# Patient Record
Sex: FEMALE | Race: WHITE | Employment: STUDENT | URBAN - METROPOLITAN AREA
[De-identification: names, ages, dates, MRNs, and addresses within clinical notes are randomized per-mention and may not be internally consistent; named-entity substitution may affect disease eponyms.]

---

## 2023-03-21 LAB
CALCIDIOL (25 OH VITAMIN D3) (NG/ML) IN SER/PLAS: 27 NG/ML
THYROTROPIN (MIU/L) IN SER/PLAS BY DETECTION LIMIT <= 0.05 MIU/L: 1.15 MIU/L (ref 0.44–3.98)

## 2024-03-04 ENCOUNTER — OFFICE VISIT (OUTPATIENT)
Dept: PRIMARY CARE | Facility: CLINIC | Age: 24
End: 2024-03-04
Payer: COMMERCIAL

## 2024-03-04 VITALS — SYSTOLIC BLOOD PRESSURE: 124 MMHG | HEART RATE: 64 BPM | TEMPERATURE: 98.2 F | DIASTOLIC BLOOD PRESSURE: 84 MMHG

## 2024-03-04 DIAGNOSIS — F41.9 ANXIETY: ICD-10-CM

## 2024-03-04 DIAGNOSIS — R05.1 ACUTE COUGH: Primary | ICD-10-CM

## 2024-03-04 DIAGNOSIS — J02.9 SORE THROAT: ICD-10-CM

## 2024-03-04 PROBLEM — E07.9 THYROID DISEASE: Status: ACTIVE | Noted: 2022-11-10

## 2024-03-04 LAB — POC RAPID STREP: NEGATIVE

## 2024-03-04 PROCEDURE — 1036F TOBACCO NON-USER: CPT | Performed by: FAMILY MEDICINE

## 2024-03-04 PROCEDURE — 87634 RSV DNA/RNA AMP PROBE: CPT

## 2024-03-04 PROCEDURE — 99214 OFFICE O/P EST MOD 30 MIN: CPT | Performed by: FAMILY MEDICINE

## 2024-03-04 PROCEDURE — 87081 CULTURE SCREEN ONLY: CPT

## 2024-03-04 PROCEDURE — 87880 STREP A ASSAY W/OPTIC: CPT | Performed by: FAMILY MEDICINE

## 2024-03-04 PROCEDURE — 87636 SARSCOV2 & INF A&B AMP PRB: CPT

## 2024-03-04 RX ORDER — SERTRALINE HYDROCHLORIDE 25 MG/1
25 TABLET, FILM COATED ORAL DAILY
COMMUNITY
Start: 2024-02-15 | End: 2024-03-04 | Stop reason: SDUPTHER

## 2024-03-04 RX ORDER — BENZONATATE 100 MG/1
100 CAPSULE ORAL 3 TIMES DAILY PRN
Qty: 42 CAPSULE | Refills: 0 | Status: SHIPPED | OUTPATIENT
Start: 2024-03-04 | End: 2024-03-22 | Stop reason: ALTCHOICE

## 2024-03-04 RX ORDER — SERTRALINE HYDROCHLORIDE 25 MG/1
25 TABLET, FILM COATED ORAL DAILY
Qty: 90 TABLET | Refills: 1 | Status: SHIPPED | OUTPATIENT
Start: 2024-03-04

## 2024-03-04 RX ORDER — FLUTICASONE PROPIONATE 50 MCG
1 SPRAY, SUSPENSION (ML) NASAL DAILY
Qty: 16 G | Refills: 0 | Status: SHIPPED | OUTPATIENT
Start: 2024-03-04 | End: 2025-03-04

## 2024-03-04 ASSESSMENT — ENCOUNTER SYMPTOMS
VOMITING: 0
SINUS PRESSURE: 1
SORE THROAT: 1
NAUSEA: 0
FATIGUE: 1
DIARRHEA: 0
COUGH: 1

## 2024-03-04 NOTE — LETTER
March 4, 2024     Patient: Vandana Jackson   YOB: 2000   Date of Visit: 3/4/2024       To Whom It May Concern:    Vandana Jackson was seen in my clinic on 3/4/2024 at 3:50 pm. Please excuse Vandana for her absence from work on 3/5/24.           Sincerely,         Tasneem Frederick, DO

## 2024-03-04 NOTE — PROGRESS NOTES
"Subjective   Patient ID: Vandana Jackson is a 24 y.o. female who presents for New Patient Visit, Sore Throat (Swelling, \"white spots on throat\"), and Cough (Congestion x 2 days/Covid/Flu, throat cx collected).    Vandana presents to [facility] care. She started getting sick yesterday. Has had cough, congestion, sore throat. No fevers.     On sertraline for anxiety. Medication working well. No adverse effects.              Review of Systems   Constitutional:  Positive for fatigue.   HENT:  Positive for congestion, sinus pressure and sore throat.    Respiratory:  Positive for cough.    Gastrointestinal:  Negative for diarrhea, nausea and vomiting.       Objective   /84   Pulse 64   Temp 36.8 °C (98.2 °F)     Physical Exam  Constitutional:       General: She is not in acute distress.     Appearance: She is not toxic-appearing.   HENT:      Right Ear: Tympanic membrane normal.      Left Ear: Tympanic membrane normal.      Nose: Congestion and rhinorrhea present.      Mouth/Throat:      Pharynx: No oropharyngeal exudate or posterior oropharyngeal erythema.   Cardiovascular:      Rate and Rhythm: Normal rate and regular rhythm.      Heart sounds: No murmur heard.  Pulmonary:      Effort: No respiratory distress.      Breath sounds: No wheezing or rales.   Neurological:      Mental Status: She is alert.         Assessment/Plan   Diagnoses and all orders for this visit:  Acute cough  Comments:  Likely viral infection. COVID/flu/RSV sent. Off work tomorrow.  Orders:  -     Sars-CoV-2 PCR; Future  -     RSV PCR; Future  -     Influenza A, and B PCR; Future  -     benzonatate (Tessalon) 100 mg capsule; Take 1 capsule (100 mg) by mouth 3 times a day as needed for cough. Do not crush or chew.  -     fluticasone (Flonase) 50 mcg/actuation nasal spray; Administer 1 spray into each nostril once daily. Shake gently. Before first use, prime pump. After use, clean tip and replace cap.  Sore throat  Comments:  Rapid strep " negative. Throat culture sent to confirm.  Orders:  -     POCT Rapid Strep A manually resulted  -     Group A Streptococcus, Culture; Future  Anxiety  Comments:  Sertraline refilled. Follow-up in 6 months or sooner as needed.  Orders:  -     sertraline (Zoloft) 25 mg tablet; Take 1 tablet (25 mg) by mouth once daily.

## 2024-03-05 LAB
FLUAV RNA RESP QL NAA+PROBE: NOT DETECTED
FLUBV RNA RESP QL NAA+PROBE: NOT DETECTED
RSV RNA RESP QL NAA+PROBE: NOT DETECTED
SARS-COV-2 RNA RESP QL NAA+PROBE: NOT DETECTED

## 2024-03-07 LAB — S PYO THROAT QL CULT: NORMAL

## 2024-03-11 ENCOUNTER — TELEPHONE (OUTPATIENT)
Dept: PRIMARY CARE | Facility: CLINIC | Age: 24
End: 2024-03-11
Payer: COMMERCIAL

## 2024-03-11 DIAGNOSIS — R05.1 ACUTE COUGH: Primary | ICD-10-CM

## 2024-03-11 RX ORDER — AZITHROMYCIN 250 MG/1
TABLET, FILM COATED ORAL
Qty: 6 TABLET | Refills: 0 | Status: SHIPPED | OUTPATIENT
Start: 2024-03-11 | End: 2024-03-22 | Stop reason: ALTCHOICE

## 2024-03-11 NOTE — TELEPHONE ENCOUNTER
Pt called, she was seen last week for sick visit. Pt still not feeling well. Loss of voice, coughing up green stuff. Pt wanting to know if ABX can be called in? This has been ongoing for over 1 week now. Nothing was sent in for pt when she was here for OV. [place]. Please advise. Thanks. VIPUL

## 2024-03-21 ENCOUNTER — TELEPHONE (OUTPATIENT)
Dept: PRIMARY CARE | Facility: CLINIC | Age: 24
End: 2024-03-21
Payer: COMMERCIAL

## 2024-03-21 DIAGNOSIS — R05.1 ACUTE COUGH: Primary | ICD-10-CM

## 2024-03-21 NOTE — TELEPHONE ENCOUNTER
Pt called in stating she was just seen on 3/4/24 for cough and pt states she is still having issues, it has not gone away. Is there anything else that can be sent in for pt or do you want to reevaluate? Please advise, AM

## 2024-03-22 ENCOUNTER — OFFICE VISIT (OUTPATIENT)
Dept: PRIMARY CARE | Facility: CLINIC | Age: 24
End: 2024-03-22
Payer: COMMERCIAL

## 2024-03-22 VITALS
OXYGEN SATURATION: 99 % | DIASTOLIC BLOOD PRESSURE: 82 MMHG | HEART RATE: 89 BPM | SYSTOLIC BLOOD PRESSURE: 124 MMHG | TEMPERATURE: 98.2 F

## 2024-03-22 DIAGNOSIS — J01.40 SUBACUTE PANSINUSITIS: ICD-10-CM

## 2024-03-22 DIAGNOSIS — R05.1 ACUTE COUGH: Primary | ICD-10-CM

## 2024-03-22 DIAGNOSIS — J02.9 SORE THROAT: ICD-10-CM

## 2024-03-22 PROBLEM — J30.2 SEASONAL ALLERGIES: Status: ACTIVE | Noted: 2024-03-22

## 2024-03-22 PROBLEM — F41.9 ANXIETY: Status: ACTIVE | Noted: 2024-03-22

## 2024-03-22 LAB — POC RAPID STREP: NEGATIVE

## 2024-03-22 PROCEDURE — 1036F TOBACCO NON-USER: CPT | Performed by: FAMILY MEDICINE

## 2024-03-22 PROCEDURE — 87081 CULTURE SCREEN ONLY: CPT

## 2024-03-22 PROCEDURE — 99214 OFFICE O/P EST MOD 30 MIN: CPT | Performed by: FAMILY MEDICINE

## 2024-03-22 PROCEDURE — 87880 STREP A ASSAY W/OPTIC: CPT | Performed by: FAMILY MEDICINE

## 2024-03-22 RX ORDER — AZITHROMYCIN 250 MG/1
TABLET, FILM COATED ORAL
Qty: 6 TABLET | Refills: 0 | Status: SHIPPED | OUTPATIENT
Start: 2024-03-22 | End: 2024-03-22 | Stop reason: ALTCHOICE

## 2024-03-22 RX ORDER — CEFDINIR 300 MG/1
300 CAPSULE ORAL 2 TIMES DAILY
Qty: 14 CAPSULE | Refills: 0 | Status: SHIPPED | OUTPATIENT
Start: 2024-03-22 | End: 2024-03-29

## 2024-03-22 ASSESSMENT — ENCOUNTER SYMPTOMS
COUGH: 1
WHEEZING: 0
DIARRHEA: 0
CHILLS: 0
FEVER: 0
PALPITATIONS: 0
SHORTNESS OF BREATH: 0
SORE THROAT: 1
VOMITING: 0

## 2024-03-22 NOTE — TELEPHONE ENCOUNTER
Canceled rx. Patient on schedule today. Already had abx called in earlier this month for same issue.

## 2024-03-22 NOTE — LETTER
March 22, 2024     Patient: Vandana Jackson   YOB: 2000   Date of Visit: 3/22/2024       To Whom It May Concern:    Vandana Jackson was seen in my clinic on 3/22/2024 at 2:10 pm. Please excuse Vandana for her absence from work on this day to make the appointment.             Sincerely,         Tasneem Frederick, DO

## 2024-03-22 NOTE — PROGRESS NOTES
Subjective   Patient ID: Vandana Jackson is a 24 y.o. female who presents for Cough (congestion), Sore Throat (Throat cx collected), and Earache (Bilat ear discomfort x 2 days).    Vandana presents with illness. Reports that she has been feeling sick since last encouter. Felt better while on azithromycin but then symptoms returned. Still having sor throat, sinus pressure and cough. No vomiting or diarrhea. Does not feel that her symptoms have acutely worsened.          Review of Systems   Constitutional:  Negative for chills and fever.   HENT:  Positive for congestion, ear pain and sore throat.    Respiratory:  Positive for cough. Negative for shortness of breath and wheezing.    Cardiovascular:  Negative for chest pain and palpitations.   Gastrointestinal:  Negative for diarrhea and vomiting.       Objective   /82   Pulse 89   Temp 36.8 °C (98.2 °F)   SpO2 99%     Physical Exam  Constitutional:       General: She is not in acute distress.     Appearance: Normal appearance.   HENT:      Head: Normocephalic.      Mouth/Throat:      Mouth: Mucous membranes are moist.   Eyes:      Extraocular Movements: Extraocular movements intact.      Conjunctiva/sclera: Conjunctivae normal.   Cardiovascular:      Rate and Rhythm: Normal rate and regular rhythm.      Heart sounds: No murmur heard.  Pulmonary:      Breath sounds: No wheezing or rhonchi.   Abdominal:      Palpations: Abdomen is soft.      Tenderness: There is no abdominal tenderness. There is no guarding.   Musculoskeletal:      Cervical back: Neck supple.   Skin:     General: Skin is warm and dry.   Neurological:      Mental Status: She is alert.   Psychiatric:         Mood and Affect: Mood normal.         Behavior: Behavior normal.         Assessment/Plan   Diagnoses and all orders for this visit:  Acute cough  -     cefdinir (Omnicef) 300 mg capsule; Take 1 capsule (300 mg) by mouth 2 times a day for 7 days.  Subacute pansinusitis  -     cefdinir  (Omnicef) 300 mg capsule; Take 1 capsule (300 mg) by mouth 2 times a day for 7 days.  Sore throat  -     POCT Rapid Strep A manually resulted  -     Group A Streptococcus, Culture; Future    Strep test negative. Start cefdinir. Throat culture sent. If not improving after antibiotics, recommend ENT referral.

## 2024-03-22 NOTE — TELEPHONE ENCOUNTER
Sorry to hear that she is not feeling better. Please let her know that I sent in a prescription for an antibiotic. If symptoms do not improve after completing antibiotic, then should be re-evaluated in office.

## 2024-03-25 LAB — S PYO THROAT QL CULT: NORMAL

## 2024-04-26 ENCOUNTER — TELEPHONE (OUTPATIENT)
Dept: PRIMARY CARE | Facility: CLINIC | Age: 24
End: 2024-04-26
Payer: COMMERCIAL

## 2024-04-26 DIAGNOSIS — J01.41 ACUTE RECURRENT PANSINUSITIS: Primary | ICD-10-CM

## 2024-05-01 ENCOUNTER — OFFICE VISIT (OUTPATIENT)
Dept: PRIMARY CARE | Facility: CLINIC | Age: 24
End: 2024-05-01
Payer: COMMERCIAL

## 2024-05-01 VITALS
OXYGEN SATURATION: 99 % | HEART RATE: 82 BPM | SYSTOLIC BLOOD PRESSURE: 118 MMHG | TEMPERATURE: 97 F | DIASTOLIC BLOOD PRESSURE: 70 MMHG

## 2024-05-01 DIAGNOSIS — H66.92 LEFT OTITIS MEDIA, UNSPECIFIED OTITIS MEDIA TYPE: ICD-10-CM

## 2024-05-01 DIAGNOSIS — J01.41 ACUTE RECURRENT PANSINUSITIS: Primary | ICD-10-CM

## 2024-05-01 PROCEDURE — 1036F TOBACCO NON-USER: CPT | Performed by: FAMILY MEDICINE

## 2024-05-01 PROCEDURE — 99214 OFFICE O/P EST MOD 30 MIN: CPT | Performed by: FAMILY MEDICINE

## 2024-05-01 RX ORDER — AMOXICILLIN AND CLAVULANATE POTASSIUM 875; 125 MG/1; MG/1
875 TABLET, FILM COATED ORAL 2 TIMES DAILY
Qty: 14 TABLET | Refills: 0 | Status: SHIPPED | OUTPATIENT
Start: 2024-05-01 | End: 2024-05-06 | Stop reason: WASHOUT

## 2024-05-01 ASSESSMENT — ENCOUNTER SYMPTOMS
SINUS PRESSURE: 1
SORE THROAT: 1
FATIGUE: 1
VOMITING: 0
FEVER: 0
DIARRHEA: 0
NAUSEA: 0
COUGH: 1
CHILLS: 1

## 2024-05-01 NOTE — PROGRESS NOTES
"Subjective   Patient ID: Vandana Jackson is a 24 y.o. female who presents for Earache (Throbbing pain when bending over, \"feels like underwater\"), Nasal Congestion, Sore Throat, and Cough (Green mucus x 1 week).    Vandana has been sick for the last week. Has been congested. Throat sore. Having sinus pressure in maxillary and frontal sinuses. Left ear painful. No fevers. Feels tired. Did got to [facility] on Saturday. COVID, flu and strep testing done and all negative.            Review of Systems   Constitutional:  Positive for chills and fatigue. Negative for fever.   HENT:  Positive for congestion, ear pain, sinus pressure and sore throat.    Respiratory:  Positive for cough.    Gastrointestinal:  Negative for diarrhea, nausea and vomiting.       Objective   /70   Pulse 82   Temp 36.1 °C (97 °F)   SpO2 99%     Physical Exam  Constitutional:       General: She is not in acute distress.     Appearance: She is not toxic-appearing.   HENT:      Right Ear: Tympanic membrane normal.      Left Ear: Tympanic membrane is erythematous.      Nose: Congestion and rhinorrhea present.   Cardiovascular:      Rate and Rhythm: Normal rate and regular rhythm.      Heart sounds: No murmur heard.  Pulmonary:      Effort: No respiratory distress.      Breath sounds: No wheezing or rales.   Neurological:      Mental Status: She is alert.         Assessment/Plan   Diagnoses and all orders for this visit:  Acute recurrent pansinusitis  Comments:  Start Augmentin. Follow-up with ENT regarding recurrent sinus infecitons.  Orders:  -     amoxicillin-pot clavulanate (Augmentin) 875-125 mg tablet; Take 1 tablet (875 mg) by mouth 2 times a day for 7 days.  Left otitis media, unspecified otitis media type  -     amoxicillin-pot clavulanate (Augmentin) 875-125 mg tablet; Take 1 tablet (875 mg) by mouth 2 times a day for 7 days.         "

## 2024-05-01 NOTE — LETTER
May 1, 2024     Patient: Vandana Jackson   YOB: 2000   Date of Visit: 5/1/2024       To Whom It May Concern:    Vandana Jackson was seen in my clinic on 5/1/2024 at 10:10 am. Please excuse Vandana for her absence from work on 5/1/24-5/2/24.           Sincerely,         Tasneem Frederick, DO

## 2024-05-06 ENCOUNTER — TELEPHONE (OUTPATIENT)
Dept: PRIMARY CARE | Facility: CLINIC | Age: 24
End: 2024-05-06
Payer: COMMERCIAL

## 2024-05-06 DIAGNOSIS — J01.41 ACUTE RECURRENT PANSINUSITIS: Primary | ICD-10-CM

## 2024-05-06 RX ORDER — CEFDINIR 300 MG/1
300 CAPSULE ORAL 2 TIMES DAILY
Qty: 14 CAPSULE | Refills: 0 | Status: SHIPPED | OUTPATIENT
Start: 2024-05-06 | End: 2024-05-13

## 2024-05-06 NOTE — TELEPHONE ENCOUNTER
Pt was seen 5/1, she states she leaves country tomorrow and is still sick. She states she improved very little, but ear pain seems worse.  Pt would like a different antibitoic.    Please advise x

## 2024-05-22 ENCOUNTER — APPOINTMENT (OUTPATIENT)
Dept: OTOLARYNGOLOGY | Facility: CLINIC | Age: 24
End: 2024-05-22
Payer: COMMERCIAL

## 2024-06-03 ENCOUNTER — TELEPHONE (OUTPATIENT)
Dept: ENDOCRINOLOGY | Facility: CLINIC | Age: 24
End: 2024-06-03

## 2024-06-03 DIAGNOSIS — E06.3 AUTOIMMUNE THYROIDITIS: Primary | Chronic | ICD-10-CM

## 2024-06-03 NOTE — TELEPHONE ENCOUNTER
Patient called to schedule follow up visit. Patient was to be seen 9/6/23 but no showed. She wants to know are labs needed at this time or should she wait until visit in October. Please advise.

## 2024-06-14 ENCOUNTER — APPOINTMENT (OUTPATIENT)
Dept: PRIMARY CARE | Facility: CLINIC | Age: 24
End: 2024-06-14
Payer: COMMERCIAL

## 2024-06-17 ENCOUNTER — APPOINTMENT (OUTPATIENT)
Dept: PRIMARY CARE | Facility: CLINIC | Age: 24
End: 2024-06-17
Payer: COMMERCIAL

## 2024-06-17 VITALS
DIASTOLIC BLOOD PRESSURE: 84 MMHG | SYSTOLIC BLOOD PRESSURE: 144 MMHG | OXYGEN SATURATION: 99 % | HEART RATE: 60 BPM | TEMPERATURE: 97.8 F

## 2024-06-17 DIAGNOSIS — J02.9 SORE THROAT: Primary | ICD-10-CM

## 2024-06-17 DIAGNOSIS — J06.9 UPPER RESPIRATORY TRACT INFECTION, UNSPECIFIED TYPE: ICD-10-CM

## 2024-06-17 LAB — POC RAPID STREP: NEGATIVE

## 2024-06-17 PROCEDURE — 87081 CULTURE SCREEN ONLY: CPT

## 2024-06-17 PROCEDURE — 1036F TOBACCO NON-USER: CPT | Performed by: PHYSICIAN ASSISTANT

## 2024-06-17 PROCEDURE — 87880 STREP A ASSAY W/OPTIC: CPT | Performed by: PHYSICIAN ASSISTANT

## 2024-06-17 PROCEDURE — 99213 OFFICE O/P EST LOW 20 MIN: CPT | Performed by: PHYSICIAN ASSISTANT

## 2024-06-17 RX ORDER — FLUTICASONE PROPIONATE 50 MCG
2 SPRAY, SUSPENSION (ML) NASAL DAILY
Qty: 16 G | Refills: 1 | Status: SHIPPED | OUTPATIENT
Start: 2024-06-17 | End: 2025-06-17

## 2024-06-17 NOTE — PROGRESS NOTES
"Subjective   Patient ID: Vandana Jackson is a 24 y.o. female who presents for Sore Throat (Swelling, \"spots on throat\" x 5 days/Throat cx collected).    HPI     Patient c/o sore throat. Also has some mild nasal drainage and PND. Not much cough. No sinus pain. Denies fever, chills, aches, shortness of breath.  Present for 5 days.  Sore throat worse in the morning and late at night.  Cough:   Nasal discharge: Described as clear.    Denies associated diarrhea, earache and vomiting.     Using Flonase periodically.   Patient denies recent known COVID exposure or international travel.     Keeps getting recurrent sore throats and URI's. Has appt with ENT to evaluate further.      reports that she has never smoked. She has never used smokeless tobacco.      Review of Systems    Objective   /84   Pulse 60   Temp 36.6 °C (97.8 °F)   SpO2 99%     Physical Exam  Vitals and nursing note reviewed.   Constitutional:       General: She is not in acute distress.     Appearance: Normal appearance.   HENT:      Head: Normocephalic.      Right Ear: Tympanic membrane, ear canal and external ear normal.      Left Ear: Tympanic membrane, ear canal and external ear normal.      Nose: Rhinorrhea present.      Right Sinus: No maxillary sinus tenderness or frontal sinus tenderness.      Left Sinus: No maxillary sinus tenderness or frontal sinus tenderness.      Mouth/Throat:      Mouth: Mucous membranes are moist.      Pharynx: Posterior oropharyngeal erythema (mild) present. No oropharyngeal exudate.      Comments: Has PND  Eyes:      General: No scleral icterus.  Cardiovascular:      Rate and Rhythm: Normal rate and regular rhythm.   Pulmonary:      Effort: Pulmonary effort is normal.      Breath sounds: Normal breath sounds. No wheezing, rhonchi or rales.   Lymphadenopathy:      Cervical: No cervical adenopathy.   Neurological:      Mental Status: She is alert.     POC Rapid Strep (no units)   Date Value   06/17/2024 Negative    "     Assessment/Plan   Diagnoses and all orders for this visit:  Sore throat  -     POCT Rapid Strep A manually resulted  -     Group A Streptococcus, Culture  -     fluticasone (Flonase) 50 mcg/actuation nasal spray; Administer 2 sprays into each nostril once daily.  Upper respiratory tract infection, unspecified type  -     fluticasone (Flonase) 50 mcg/actuation nasal spray; Administer 2 sprays into each nostril once daily.       Discussed probable viral nature of illness. Send throat cx.   Can use Mucinex DM.   Encourage fluids and rest. Do warm saltwater gargles.   Use flonase daily. Rx sent.   See ENT next month  Follow up if symptoms increase or persist.

## 2024-06-19 LAB — S PYO THROAT QL CULT: NORMAL

## 2024-07-22 ENCOUNTER — APPOINTMENT (OUTPATIENT)
Dept: OTOLARYNGOLOGY | Facility: CLINIC | Age: 24
End: 2024-07-22
Payer: COMMERCIAL

## 2024-07-22 VITALS
DIASTOLIC BLOOD PRESSURE: 69 MMHG | TEMPERATURE: 98 F | HEIGHT: 69 IN | WEIGHT: 160.2 LBS | BODY MASS INDEX: 23.73 KG/M2 | SYSTOLIC BLOOD PRESSURE: 107 MMHG

## 2024-07-22 DIAGNOSIS — H93.13 TINNITUS OF BOTH EARS: ICD-10-CM

## 2024-07-22 DIAGNOSIS — H91.93 HEARING DIFFICULTY OF BOTH EARS: Primary | ICD-10-CM

## 2024-07-22 PROCEDURE — 99203 OFFICE O/P NEW LOW 30 MIN: CPT

## 2024-07-22 PROCEDURE — 1036F TOBACCO NON-USER: CPT

## 2024-07-22 PROCEDURE — 3008F BODY MASS INDEX DOCD: CPT

## 2024-07-22 ASSESSMENT — ENCOUNTER SYMPTOMS: DEPRESSION: 0

## 2024-07-22 NOTE — PROGRESS NOTES
Patient ID: Vandana Jackson is a 24 y.o. female who presents for the evaluation of hearing difficulty in both ears. They present as a referral from Dr. Milla Frederick (PCP).    PROVIDER IMPRESSIONS:  DIAGNOSES/PROBLEMS:  -Hearing difficulty in both ears  -Tinnitus of both ears    ASSESSMENT:   Vandana Jackson is a pleasant 24 y.o. female who presents with symptoms of bilateral intermittent non-pulsatile tinnitus and bilateral hearing difficulty, left greater than right. Based on the clinical information provided, symptoms and clinical exam findings are unable to be determined without an audiogram. Reassurance provided to patient that exam today showed no evidence of acute infection or inflammation in the EAC bilaterally and that TM appears with no evidence of infection, effusion, retraction or perforation bilaterally. I explained to patient that audiogram is necessary in order to further determine recommendations for symptom management.    PLAN:  I recommended audiogram in the next 1-4 weeks to further evaluate hearing and middle ear function. Referral e-submitted.   Follow-up: Patient may schedule for follow-up virtually after obtaining audiogram to discuss the results. They may follow-up sooner, if needed. Patient is agreeable to this plan, all questions were answered to patient's satisfaction.     Subjective   HPI: Vandana Jackson is a 24 y.o. female who presents for the evaluation of hearing difficulty in both ears. The patient states that symptom onset began approximately 1 month ago following a URI and treatment for bilateral ear infection. She reports that her sister told her that when patient is talking now she sounds like she is screaming/speaking loudly. Mentions that she has experienced recurrent URI symptoms for approximately 7 months. When asked about the presence of hearing loss, ear pain, ear fullness/pressure, tinnitus, ear itching, ear drainage, autophony, dizziness or vertigo, she admits to  "intermittent tinnitus in both ears. Describes tinnitus as a non-pulsatile ringing sound that is non-bothersome. When asked about pertinent otologic history, the patient reports a history of recurrent ear infections, denies history of ear surgery, reports history of multiple PE tube insertions in childhood, and denies history of prolonged/traumatic loud noise exposure. The patient does not endorse a family history of hearing loss.   Patient previously seen by PCP on 3/22/24 for symptoms of nasal congestion, cough, ear pain, and sore throat and treated for sinus/ear infection with a z-pratima and p.o. cefdinir BID x 7 days. She was previously treated with azithromycin on 3/11/24. She was advised by PCP to start flonase on 3/4/24 but did not use because she did not want to pay for nasal spray.       PATIENT HISTORY:  Past Medical History:   Diagnosis Date    Other seasonal allergic rhinitis     Seasonal allergies      Past Surgical History:   Procedure Laterality Date    LYMPH NODE BIOPSY      age 11      Allergies   Allergen Reactions    Dog Dander Other     Congestion, watery eyes    Pollen Extracts Itching        Current Outpatient Medications:     fluticasone (Flonase) 50 mcg/actuation nasal spray, Administer 2 sprays into each nostril once daily., Disp: 16 g, Rfl: 1    sertraline (Zoloft) 25 mg tablet, Take 1 tablet (25 mg) by mouth once daily., Disp: 90 tablet, Rfl: 1   Tobacco Use: Low Risk  (7/22/2024)    Patient History     Smoking Tobacco Use: Never     Smokeless Tobacco Use: Never     Passive Exposure: Not on file      Alcohol Use: Not on file      Social History     Substance and Sexual Activity   Drug Use Never        Review of Systems   All other systems negative.     Objective   Visit Vitals  /69 (BP Location: Right arm, Patient Position: Sitting, BP Cuff Size: Adult)   Temp 36.7 °C (98 °F)   Ht 1.753 m (5' 9\")   Wt 72.7 kg (160 lb 3.2 oz)   BMI 23.66 kg/m²   Smoking Status Never   BSA 1.88 m²    "     PHYSICAL EXAM:  General appearance: Appears well, well-nourished, well groomed. No acute distress.   Constitutional: No fever, chills, weight loss or weight gain.  Communication: Normal communication  Psychiatric: Oriented to person, place and time. Normal mood and affect.  Neurologic: Cranial nerves II-XII grossly intact and symmetric bilaterally.  Cardiovascular: Examination of peripheral vascular system shows no clubbing or cyanosis.  Respiratory: No respiratory distress increased work of breathing. Inspection of the chest with symmetric chest expansion and normal respiratory effort.  Skin: No head and neck rashes.  Head: Normocephalic. Atraumatic with no masses, lesions or scarring.  Face: Normal symmetry. No scars or deformities.  Eyes: Conjunctiva not edematous or erythematous. PERRLA  Neck: Supple and symmetric, trachea midline. Lymph nodes with no adenopathy.  Head: Normocephalic. Atraumatic with no masses, lesions or scarring.  Eyes: PERRL, EOMI, Conjunctiva is clear. No nystagmus.  Nose: External inspection of nose: No nasal lesions, lacerations or scars. No tenderness on frontal or maxillary sinus palpation. Anterior rhinoscopy with limited visualization past the inferior turbinates: Septum is midline.  No septal perforation or lesions. No septal hematoma/ seroma.  No signs of bleeding.  No evidence of intranasal polyps.  Inferior turbinates are normal.    Throat:  Floor of mouth is clear, no masses.  Tongue appears normal, no lesions or masses. Gums, gingiva, buccal mucosa appear pink and moist, no lesions. Teeth are in intact.  No obvious dental infections.  Peritonsillar regions appear symmetric without swelling.  Hard and soft palate appear normal, no obvious cleft. Uvula is midline.  Left Tonsil -- 2+ and cryptic, no exudates.  Right Tonsil -- 2+ and cryptic, no exudates.  Oropharynx: No lesions. Retropharyngeal wall is flat.  No postnasal drip.  Salivary Glands: Symmetric bilaterally.  No  palpable masses.  No evidence of acute infection or salivary stones.  TMJ: Normal, no trismus.  Right Ear: External inspection of ear with no deformity, scars, or masses. Mastoid is nontender. External auditory canal is clear.  TM is intact with no sign of infection, effusion, or retraction.  No perforation seen. Auto insufflation visible under microscopy.  Left Ear: External inspection of ear with no deformity, scars, or masses. Mastoid is nontender. External auditory canal is clear.  TM is intact with no sign of infection, effusion, or retraction.  No perforation seen. Auto insufflation visible under microscopy.    RESULTS: No audiogram available for review.    Susu Barrientos, APRN-CNP

## 2024-08-22 ENCOUNTER — APPOINTMENT (OUTPATIENT)
Dept: AUDIOLOGY | Facility: CLINIC | Age: 24
End: 2024-08-22
Payer: COMMERCIAL

## 2024-08-23 ENCOUNTER — APPOINTMENT (OUTPATIENT)
Dept: OTOLARYNGOLOGY | Facility: CLINIC | Age: 24
End: 2024-08-23
Payer: COMMERCIAL

## 2024-09-04 ENCOUNTER — APPOINTMENT (OUTPATIENT)
Dept: PRIMARY CARE | Facility: CLINIC | Age: 24
End: 2024-09-04
Payer: COMMERCIAL

## 2024-10-14 NOTE — PATIENT INSTRUCTIONS
Thank you for choosing [facility]  for your health care needs.  If you have any questions, concerns or medical needs, please feel free to contact our office at [phone].    Please ensure you complete your blood work one week before the next scheduled appointment.    Will await blood tests from today  To obtain a thyroid ultrasound   Will call with result   To follow up in 1 year if the above are not concerning

## 2024-10-14 NOTE — PROGRESS NOTES
Subjective   Vandana Jackson is a 24 y.o. female who presents  for follow up for autoimmune thyroiditis.     She has had no major changes to her health since her last appointment.  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      Symptoms are as listed below:  Energy levels - good  Sleep - occasional disruptions due to stress   Temperature intolerances -denies   Bowel movements -regular; once or twice daily   Hair changes -denies   Skin changes - acne is worse    Palpitations denies   Diaphoresis - denies   Tremors -denies  Mood - on zoloft   Increasing size - denies   Dysphagia -denies   Dyspnea upon lying supine - denies   Dysphonia -denies   Weight changes - gained   Menstrual history - LMP September 26, 2024     Tobacco use - vapes   Started when she was 18; none for two years      Family history:  2 aunts - hyperthyroid   Grandmother - hyperthyroid   Mother - hypothyroid     Review of Systems   Respiratory:  Positive for cough.    Gastrointestinal:  Positive for nausea.   All other systems reviewed and are negative.      Objective   /70 (BP Location: Left arm, Patient Position: Sitting, BP Cuff Size: Adult)   Pulse 92   Wt 73.6 kg (162 lb 3.2 oz)   BMI 23.95 kg/m²    Physical Exam  Vitals and nursing note reviewed.   Constitutional:       General: She is not in acute distress.     Appearance: Normal appearance. She is normal weight.   HENT:      Head: Normocephalic and atraumatic.      Nose: Nose normal.      Mouth/Throat:      Mouth: Mucous membranes are moist.   Eyes:      Extraocular Movements: Extraocular movements intact.   Cardiovascular:      Rate and Rhythm: Normal rate and regular rhythm.   Pulmonary:      Effort: Pulmonary effort is normal.      Breath sounds: Normal breath sounds.   Musculoskeletal:         General: Normal range of motion.   Skin:     General: Skin is warm.    Neurological:      Mental Status: She is alert and oriented to person, place, and time.   Psychiatric:         Mood and Affect: Mood normal.       Lab Results   Component Value Date    TSH 1.15 03/20/2023    FREET4 0.76 02/28/2022     Imaging  Thyroid Ultrasound 3/4/2022  FINDINGS:  PARENCHYMA:  Diffusely heterogenous parenchmya with decreased  echogenicity. Mildly increased vascularity on color Doppler.     SIZE:  RIGHT LOBE:  5.3 x 1.8 x 1.9 cm.  LEFT LOBE:  5.1 x 1.7 x 1.9 cm.  ISTHMUS:  0.5 cm.     0.8 cm right thyroid midpole nodule which is hypoechoic and solid in appearance compatible a TIRADS 3 classification nodule.     CERVICAL LYMPH NODES:  No enlarged or morphologically abnormal cervical nodes are seen.     IMPRESSION:  1. Heterogeneous, enlarged thyroid with mildly increased vascularity suggestive of thyroiditis.  2. 0.8 cm right thyroid midpole TIRADS 3 classification nodule.    Assessment/Plan   24 year old female presents for follow up for Hashimoto's thyroiditis. She has not yet needed thyroid replacement therapy.    Autoimmune thyroiditis  Will await blood tests from today      Right thyroid nodule  To obtain a thyroid ultrasound   Will call with result     To follow up in 1 year if the above are not concerning

## 2024-10-15 ENCOUNTER — LAB (OUTPATIENT)
Dept: LAB | Facility: LAB | Age: 24
End: 2024-10-15

## 2024-10-15 ENCOUNTER — APPOINTMENT (OUTPATIENT)
Dept: ENDOCRINOLOGY | Facility: CLINIC | Age: 24
End: 2024-10-15
Payer: COMMERCIAL

## 2024-10-15 VITALS
DIASTOLIC BLOOD PRESSURE: 70 MMHG | BODY MASS INDEX: 23.95 KG/M2 | HEART RATE: 92 BPM | WEIGHT: 162.2 LBS | SYSTOLIC BLOOD PRESSURE: 112 MMHG

## 2024-10-15 DIAGNOSIS — E06.3 AUTOIMMUNE THYROIDITIS: Chronic | ICD-10-CM

## 2024-10-15 DIAGNOSIS — F41.9 ANXIETY: ICD-10-CM

## 2024-10-15 DIAGNOSIS — E04.1 RIGHT THYROID NODULE: ICD-10-CM

## 2024-10-15 DIAGNOSIS — E06.3 AUTOIMMUNE THYROIDITIS: Primary | ICD-10-CM

## 2024-10-15 LAB
T4 FREE SERPL-MCNC: 0.87 NG/DL (ref 0.61–1.12)
TSH SERPL-ACNC: 1.88 MIU/L (ref 0.44–3.98)

## 2024-10-15 PROCEDURE — 84443 ASSAY THYROID STIM HORMONE: CPT

## 2024-10-15 PROCEDURE — 36415 COLL VENOUS BLD VENIPUNCTURE: CPT

## 2024-10-15 PROCEDURE — 99214 OFFICE O/P EST MOD 30 MIN: CPT | Performed by: INTERNAL MEDICINE

## 2024-10-15 PROCEDURE — 84439 ASSAY OF FREE THYROXINE: CPT

## 2024-10-15 RX ORDER — CETIRIZINE HYDROCHLORIDE 10 MG/1
TABLET, CHEWABLE ORAL DAILY
COMMUNITY

## 2024-10-15 ASSESSMENT — ENCOUNTER SYMPTOMS
COUGH: 1
NAUSEA: 1

## 2024-10-17 RX ORDER — SERTRALINE HYDROCHLORIDE 25 MG/1
25 TABLET, FILM COATED ORAL DAILY
Qty: 90 TABLET | Refills: 1 | OUTPATIENT
Start: 2024-10-17

## 2024-10-17 NOTE — ASSESSMENT & PLAN NOTE
To obtain a thyroid ultrasound   Will call with result     To follow up in 1 year if the above are not concerning

## 2024-10-18 ENCOUNTER — TELEPHONE (OUTPATIENT)
Dept: ENDOCRINOLOGY | Facility: CLINIC | Age: 24
End: 2024-10-18
Payer: COMMERCIAL

## 2024-10-18 NOTE — TELEPHONE ENCOUNTER
----- Message from Christen Santos sent at 10/17/2024  1:36 PM EDT -----  Labs have been reviewed.  The thyroid level is normal.

## 2024-11-07 DIAGNOSIS — F41.9 ANXIETY: ICD-10-CM

## 2024-11-07 RX ORDER — SERTRALINE HYDROCHLORIDE 25 MG/1
25 TABLET, FILM COATED ORAL DAILY
Qty: 30 TABLET | Refills: 0 | Status: SHIPPED | OUTPATIENT
Start: 2024-11-07

## 2024-11-07 NOTE — TELEPHONE ENCOUNTER
Pt called and is scheduled for 11/27 with [facility].  Pt needs short supply until OV

## 2024-11-27 ENCOUNTER — APPOINTMENT (OUTPATIENT)
Dept: PRIMARY CARE | Facility: CLINIC | Age: 24
End: 2024-11-27
Payer: COMMERCIAL

## 2024-11-27 VITALS
DIASTOLIC BLOOD PRESSURE: 82 MMHG | OXYGEN SATURATION: 98 % | WEIGHT: 161 LBS | HEART RATE: 84 BPM | BODY MASS INDEX: 23.78 KG/M2 | SYSTOLIC BLOOD PRESSURE: 124 MMHG | TEMPERATURE: 97.6 F

## 2024-11-27 DIAGNOSIS — Z13.0 SCREENING FOR DEFICIENCY ANEMIA: ICD-10-CM

## 2024-11-27 DIAGNOSIS — Z13.1 SCREENING FOR DIABETES MELLITUS: ICD-10-CM

## 2024-11-27 DIAGNOSIS — F41.9 ANXIETY: Primary | ICD-10-CM

## 2024-11-27 DIAGNOSIS — Z13.220 SCREENING FOR HYPERLIPIDEMIA: ICD-10-CM

## 2024-11-27 PROCEDURE — 99214 OFFICE O/P EST MOD 30 MIN: CPT | Performed by: FAMILY MEDICINE

## 2024-11-27 RX ORDER — SERTRALINE HYDROCHLORIDE 50 MG/1
50 TABLET, FILM COATED ORAL DAILY
Qty: 90 TABLET | Refills: 1 | Status: SHIPPED | OUTPATIENT
Start: 2024-11-27

## 2024-11-27 RX ORDER — SERTRALINE HYDROCHLORIDE 25 MG/1
25 TABLET, FILM COATED ORAL DAILY
Qty: 90 TABLET | Refills: 1 | Status: CANCELLED | OUTPATIENT
Start: 2024-11-27

## 2024-11-27 ASSESSMENT — ENCOUNTER SYMPTOMS
CHILLS: 0
FEVER: 0
DYSPHORIC MOOD: 1
NERVOUS/ANXIOUS: 1
SLEEP DISTURBANCE: 0
COUGH: 0

## 2024-11-27 NOTE — PROGRESS NOTES
Subjective   Patient ID: Vandana Jackson is a 24 y.o. female who presents for Anxiety.    Vandana reports that her anxiety and depression have been a little bit worse since the winter months have set in.  She does feel that the sertraline is helping, but she is having more bad days than good days.  She is not experiencing any side effects from the medication.         Review of Systems   Constitutional:  Negative for chills and fever.   HENT:  Negative for congestion.    Respiratory:  Negative for cough.    Psychiatric/Behavioral:  Positive for dysphoric mood. Negative for sleep disturbance. The patient is nervous/anxious.        Objective   /82   Pulse 84   Temp 36.4 °C (97.6 °F)   Wt 73 kg (161 lb)   SpO2 98%   BMI 23.78 kg/m²     Physical Exam  Constitutional:       General: She is not in acute distress.     Appearance: Normal appearance.   HENT:      Head: Normocephalic.   Pulmonary:      Effort: Pulmonary effort is normal.   Neurological:      General: No focal deficit present.      Mental Status: She is alert.   Psychiatric:         Mood and Affect: Mood normal.         Behavior: Behavior normal.         Thought Content: Thought content normal.         Judgment: Judgment normal.         Assessment/Plan   Diagnoses and all orders for this visit:  Anxiety  Comments:  Increase sertraline to 50 mg.  Notify office of any side effects from dose increase.  Follow-up in 6 months or sooner as needed.  Orders:  -     sertraline (Zoloft) 50 mg tablet; Take 1 tablet (50 mg) by mouth once daily.  Screening for hyperlipidemia  -     Lipid Panel; Future  Screening for diabetes mellitus  -     Comprehensive Metabolic Panel; Future  -     Hemoglobin A1C; Future  Screening for deficiency anemia  -     CBC and Auto Differential; Future

## 2024-12-02 ENCOUNTER — APPOINTMENT (OUTPATIENT)
Dept: RADIOLOGY | Facility: HOSPITAL | Age: 24
End: 2024-12-02
Payer: COMMERCIAL

## 2024-12-06 ENCOUNTER — HOSPITAL ENCOUNTER (OUTPATIENT)
Dept: RADIOLOGY | Facility: HOSPITAL | Age: 24
Discharge: HOME | End: 2024-12-06
Payer: COMMERCIAL

## 2024-12-06 DIAGNOSIS — E04.1 RIGHT THYROID NODULE: ICD-10-CM

## 2024-12-06 DIAGNOSIS — E06.3 AUTOIMMUNE THYROIDITIS: ICD-10-CM

## 2024-12-06 PROCEDURE — 76536 US EXAM OF HEAD AND NECK: CPT

## 2024-12-23 ENCOUNTER — TELEPHONE (OUTPATIENT)
Dept: ENDOCRINOLOGY | Facility: CLINIC | Age: 24
End: 2024-12-23
Payer: COMMERCIAL

## 2024-12-23 NOTE — TELEPHONE ENCOUNTER
----- Message from Christen Santos sent at 12/9/2024  3:38 PM EST -----  Thyroid ultrasound has been reviewed.  Thyroid nodules are less than 1cm in size and thus are not concerning.  For follow up as scheduled.

## 2025-01-06 ENCOUNTER — APPOINTMENT (OUTPATIENT)
Dept: PRIMARY CARE | Facility: CLINIC | Age: 25
End: 2025-01-06
Payer: COMMERCIAL

## 2025-01-27 ENCOUNTER — APPOINTMENT (OUTPATIENT)
Dept: PRIMARY CARE | Facility: CLINIC | Age: 25
End: 2025-01-27
Payer: COMMERCIAL

## 2025-01-27 VITALS
TEMPERATURE: 97.2 F | OXYGEN SATURATION: 99 % | DIASTOLIC BLOOD PRESSURE: 68 MMHG | HEART RATE: 61 BPM | SYSTOLIC BLOOD PRESSURE: 104 MMHG

## 2025-01-27 DIAGNOSIS — J01.40 ACUTE NON-RECURRENT PANSINUSITIS: Primary | ICD-10-CM

## 2025-01-27 DIAGNOSIS — R05.1 ACUTE COUGH: ICD-10-CM

## 2025-01-27 PROCEDURE — 99214 OFFICE O/P EST MOD 30 MIN: CPT | Performed by: FAMILY MEDICINE

## 2025-01-27 RX ORDER — DOXYCYCLINE 100 MG/1
100 CAPSULE ORAL 2 TIMES DAILY
Qty: 14 CAPSULE | Refills: 0 | Status: SHIPPED | OUTPATIENT
Start: 2025-01-27 | End: 2025-02-03

## 2025-01-27 RX ORDER — BENZONATATE 100 MG/1
100 CAPSULE ORAL 3 TIMES DAILY PRN
Qty: 30 CAPSULE | Refills: 0 | Status: SHIPPED | OUTPATIENT
Start: 2025-01-27

## 2025-01-27 ASSESSMENT — ENCOUNTER SYMPTOMS
SORE THROAT: 0
NAUSEA: 0
DIARRHEA: 0
ABDOMINAL PAIN: 0
DYSURIA: 0
FEVER: 0
WHEEZING: 0
COUGH: 1
CHILLS: 0
VOMITING: 0
SHORTNESS OF BREATH: 0

## 2025-01-27 NOTE — PROGRESS NOTES
Subjective   Patient ID: Vandana Jackson is a 24 y.o. female who presents for Follow-up ([facility] in [place] on 1/22 Re: fever, cough, congestion, headache).    Vandana has been sick for one week. Started with congestion, cough, sore throat and ear pain. Now just persistent congestion and cough. Bringing up yellow sputum with cough. No shortness of breath or wheezing.  Some sinus pressure. No sore throat or ear pain today. No fevers. Went to [facility] but was not given any prescriptions.          Review of Systems   Constitutional:  Negative for chills and fever.   HENT:  Positive for congestion. Negative for ear pain and sore throat.    Respiratory:  Positive for cough. Negative for shortness of breath and wheezing.    Cardiovascular:  Negative for chest pain.   Gastrointestinal:  Negative for abdominal pain, diarrhea, nausea and vomiting.   Genitourinary:  Negative for dysuria.       Objective   /68   Pulse 61   Temp 36.2 °C (97.2 °F)   SpO2 99%     Physical Exam  Constitutional:       General: She is not in acute distress.     Appearance: She is not toxic-appearing.   HENT:      Right Ear: Tympanic membrane normal.      Left Ear: Tympanic membrane normal.      Nose: Congestion and rhinorrhea present.      Mouth/Throat:      Mouth: Mucous membranes are moist.      Pharynx: No oropharyngeal exudate or posterior oropharyngeal erythema.   Eyes:      Extraocular Movements: Extraocular movements intact.   Cardiovascular:      Rate and Rhythm: Normal rate and regular rhythm.      Heart sounds: No murmur heard.  Pulmonary:      Effort: Pulmonary effort is normal. No respiratory distress.      Breath sounds: No wheezing or rales.   Musculoskeletal:      Cervical back: Neck supple.   Lymphadenopathy:      Cervical: No cervical adenopathy.   Neurological:      Mental Status: She is alert.         Assessment/Plan   Diagnoses and all orders for this visit:  Acute non-recurrent pansinusitis  -      doxycycline (Vibramycin) 100 mg capsule; Take 1 capsule (100 mg) by mouth 2 times a day for 7 days. Take with at least 8 ounces (large glass) of water, do not lie down for 30 minutes after  Acute cough  -     doxycycline (Vibramycin) 100 mg capsule; Take 1 capsule (100 mg) by mouth 2 times a day for 7 days. Take with at least 8 ounces (large glass) of water, do not lie down for 30 minutes after  -     benzonatate (Tessalon) 100 mg capsule; Take 1 capsule (100 mg) by mouth 3 times a day as needed for cough. Do not crush or chew.    Call office if symptoms not improving in next 7-10 days.

## 2025-04-29 ENCOUNTER — OFFICE VISIT (OUTPATIENT)
Dept: PRIMARY CARE | Facility: CLINIC | Age: 25
End: 2025-04-29
Payer: COMMERCIAL

## 2025-04-29 VITALS
TEMPERATURE: 97.8 F | DIASTOLIC BLOOD PRESSURE: 62 MMHG | OXYGEN SATURATION: 97 % | SYSTOLIC BLOOD PRESSURE: 88 MMHG | HEART RATE: 64 BPM

## 2025-04-29 DIAGNOSIS — L73.1 INGROWN HAIR: ICD-10-CM

## 2025-04-29 DIAGNOSIS — J30.1 SEASONAL ALLERGIC RHINITIS DUE TO POLLEN: ICD-10-CM

## 2025-04-29 DIAGNOSIS — J32.9 RECURRENT SINUSITIS: Primary | ICD-10-CM

## 2025-04-29 PROCEDURE — 99214 OFFICE O/P EST MOD 30 MIN: CPT | Performed by: FAMILY MEDICINE

## 2025-04-29 RX ORDER — FLUTICASONE PROPIONATE 50 MCG
2 SPRAY, SUSPENSION (ML) NASAL DAILY
Qty: 16 G | Refills: 5 | Status: SHIPPED | OUTPATIENT
Start: 2025-04-29

## 2025-04-29 RX ORDER — CEFDINIR 300 MG/1
300 CAPSULE ORAL 2 TIMES DAILY
Qty: 14 CAPSULE | Refills: 0 | Status: SHIPPED | OUTPATIENT
Start: 2025-04-29 | End: 2025-05-06

## 2025-04-29 ASSESSMENT — ENCOUNTER SYMPTOMS
ROS SKIN COMMENTS: INGROWN HAIR
COUGH: 1
CHILLS: 0
FEVER: 0
SINUS PRESSURE: 1

## 2025-04-29 NOTE — PROGRESS NOTES
Subjective   Patient ID: Vandana Jackson is a 25 y.o. female who presents for Hospital Follow-up (F/U from [facility] on 4/19. Re: Sinusitis- Patient states still having green sinus drainage and congestion ).    Vandana has been experiencing congestion and sinus pressure.  This has been ongoing for several weeks.  She was seen in the [facility] on April 19.  She was prescribed a course of amoxicillin.  Her sinus pressure did improve somewhat, but she is still having thick mucosal drainage and occasional pressure.  She has been coughing up green mucus.    She also has been experiencing worsening allergy symptoms.  She is taking her Zyrtec daily.  Has been using her mom's nasal spray Flonase which has also been helping.  She is allergic to pollen.    She has several ingrown hairs in her bikini area.  Is wants to know what to do to prevent them from getting worse.             Review of Systems   Constitutional:  Negative for chills and fever.   HENT:  Positive for congestion and sinus pressure.    Respiratory:  Positive for cough.    Skin:         Ingrown hair       Objective   BP 88/62   Pulse 64   Temp 36.6 °C (97.8 °F)   SpO2 97%     Physical Exam  Constitutional:       General: She is not in acute distress.     Appearance: She is not toxic-appearing.   HENT:      Right Ear: Tympanic membrane normal.      Left Ear: Tympanic membrane normal.      Nose: Congestion and rhinorrhea present.   Cardiovascular:      Rate and Rhythm: Normal rate and regular rhythm.      Heart sounds: No murmur heard.  Pulmonary:      Effort: No respiratory distress.      Breath sounds: No wheezing or rales.   Skin:     General: Skin is warm and dry.      Comments: Several small ingrown hairs in groin with acute erythema or swelling   Neurological:      Mental Status: She is alert.         Assessment/Plan   Diagnoses and all orders for this visit:  Recurrent sinusitis  Comments:  Since symptoms have completely resolved after  amoxicillin treatment, start cefdinir.  Contact office if symptoms do not resolve.  Orders:  -     cefdinir (Omnicef) 300 mg capsule; Take 1 capsule (300 mg) by mouth 2 times a day for 7 days.  Seasonal allergic rhinitis due to pollen  Comments:  Continue cetirizine.  Start Flonase.  Okay to call for prescription for montelukast if symptoms not improving.  Orders:  -     fluticasone (Flonase) 50 mcg/actuation nasal spray; Administer 2 sprays into each nostril once daily. Shake gently. Before first use, prime pump. After use, clean tip and replace cap.  Ingrown hair  Comments:  Discussed using warm, moist compress when ingrown hairs appear.  Recommend treating with minimum blades and using adequate lubrication.

## 2025-06-05 ENCOUNTER — TELEPHONE (OUTPATIENT)
Dept: PRIMARY CARE | Facility: CLINIC | Age: 25
End: 2025-06-05
Payer: COMMERCIAL

## 2025-06-05 DIAGNOSIS — F41.9 ANXIETY: Primary | ICD-10-CM

## 2025-06-05 RX ORDER — SERTRALINE HYDROCHLORIDE 100 MG/1
100 TABLET, FILM COATED ORAL DAILY
Qty: 30 TABLET | Refills: 1 | Status: SHIPPED | OUTPATIENT
Start: 2025-06-05 | End: 2025-08-04

## 2025-06-05 NOTE — TELEPHONE ENCOUNTER
Ok to increase to 100mg but do not increase further without consulting with me. I sent in short refill. Is due for follow-up. Ok to schedule first available that is not same-day slot.

## 2025-06-05 NOTE — TELEPHONE ENCOUNTER
Pt called Rx line confused about her sertraline, pt states she was taking 100mg but pharmacy gave her 50mg and she didn't realize. Pt is asking if she can get 100mg? Please advise, AM

## 2025-07-03 ENCOUNTER — APPOINTMENT (OUTPATIENT)
Dept: PRIMARY CARE | Facility: CLINIC | Age: 25
End: 2025-07-03

## 2025-07-03 VITALS
TEMPERATURE: 97.5 F | SYSTOLIC BLOOD PRESSURE: 124 MMHG | WEIGHT: 168 LBS | HEART RATE: 76 BPM | DIASTOLIC BLOOD PRESSURE: 82 MMHG | HEIGHT: 71 IN | OXYGEN SATURATION: 97 % | BODY MASS INDEX: 23.52 KG/M2

## 2025-07-03 DIAGNOSIS — F41.9 ANXIETY: Primary | ICD-10-CM

## 2025-07-03 DIAGNOSIS — N94.2 VAGINISMUS: ICD-10-CM

## 2025-07-03 PROCEDURE — 99214 OFFICE O/P EST MOD 30 MIN: CPT | Performed by: FAMILY MEDICINE

## 2025-07-03 PROCEDURE — 3008F BODY MASS INDEX DOCD: CPT | Performed by: FAMILY MEDICINE

## 2025-07-03 PROCEDURE — 1036F TOBACCO NON-USER: CPT | Performed by: FAMILY MEDICINE

## 2025-07-03 RX ORDER — SERTRALINE HYDROCHLORIDE 100 MG/1
100 TABLET, FILM COATED ORAL DAILY
Qty: 90 TABLET | Refills: 3 | Status: SHIPPED | OUTPATIENT
Start: 2025-07-03

## 2025-07-03 ASSESSMENT — ENCOUNTER SYMPTOMS
CHILLS: 0
NERVOUS/ANXIOUS: 0
FEVER: 0
COUGH: 0
DYSPHORIC MOOD: 0

## 2025-07-03 NOTE — PATIENT INSTRUCTIONS
Consider pelvic floor therapy (a type of physical therapy). Ask insurance about number of covered visits and if there is a co-pay for visits.

## 2025-07-03 NOTE — PROGRESS NOTES
"Subjective   Patient ID: Vandana Jackson is a 25 y.o. female who presents for Anxiety.    Vandana has been feeling well. Anxiety medication is working well. Doing better at higher dose.     Also has history of vaginismus and is wondering what can do to help. Has difficulty with intercourse. Finds that it gets easier when she has been with a partner for awhile. Not having any pelvic pain.          Review of Systems   Constitutional:  Negative for chills and fever.   HENT:  Negative for congestion.    Respiratory:  Negative for cough.    Genitourinary:  Negative for vaginal bleeding, vaginal discharge and vaginal pain.   Psychiatric/Behavioral:  Negative for dysphoric mood. The patient is not nervous/anxious.        Objective   /82   Pulse 76   Temp 36.4 °C (97.5 °F)   Ht 1.803 m (5' 11\")   Wt 76.2 kg (168 lb)   SpO2 97%   BMI 23.43 kg/m²     Physical Exam  Constitutional:       General: She is not in acute distress.     Appearance: Normal appearance.   HENT:      Head: Normocephalic.   Pulmonary:      Effort: Pulmonary effort is normal.   Neurological:      General: No focal deficit present.      Mental Status: She is alert.   Psychiatric:         Mood and Affect: Mood normal.         Behavior: Behavior normal.         Assessment/Plan   Diagnoses and all orders for this visit:  Anxiety  Comments:  Continue sertraline. Follow-up in one year or sooner as needed.  Orders:  -     sertraline (Zoloft) 100 mg tablet; Take 1 tablet (100 mg) by mouth once daily.  Vaginismus  Comments:  Discussed pelvic floor therapy. Patient will check insurance coverage and call for order. Also reviewed use of dilator at home; handout given.  Other orders  -     Follow Up In Primary Care; Future         "

## 2025-10-15 ENCOUNTER — APPOINTMENT (OUTPATIENT)
Dept: ENDOCRINOLOGY | Facility: CLINIC | Age: 25
End: 2025-10-15
Payer: COMMERCIAL